# Patient Record
Sex: FEMALE | Race: BLACK OR AFRICAN AMERICAN | NOT HISPANIC OR LATINO | Employment: UNEMPLOYED | URBAN - METROPOLITAN AREA
[De-identification: names, ages, dates, MRNs, and addresses within clinical notes are randomized per-mention and may not be internally consistent; named-entity substitution may affect disease eponyms.]

---

## 2022-05-26 ENCOUNTER — HOSPITAL ENCOUNTER (EMERGENCY)
Facility: HOSPITAL | Age: 27
Discharge: HOME | End: 2022-05-26
Attending: EMERGENCY MEDICINE
Payer: MEDICAID

## 2022-05-26 VITALS
HEART RATE: 115 BPM | DIASTOLIC BLOOD PRESSURE: 99 MMHG | TEMPERATURE: 98 F | SYSTOLIC BLOOD PRESSURE: 155 MMHG | RESPIRATION RATE: 18 BRPM

## 2022-05-26 DIAGNOSIS — T19.2XXA RETAINED TAMPON, INITIAL ENCOUNTER: Primary | ICD-10-CM
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
## 2022-05-26 DIAGNOSIS — N76.0 ACUTE VAGINITIS: ICD-10-CM

## 2022-05-26 LAB
BACTERIA GENITAL QL WET PREP: ABNORMAL
BILIRUB UR QL STRIP: NEGATIVE
CLARITY UR REFRACT.AUTO: ABNORMAL
CLUE CELLS VAG QL WET PREP: ABNORMAL
COLOR UR AUTO: YELLOW
FILAMENT FUNGI VAG WET PREP-#/AREA: ABNORMAL
GLUCOSE UR QL STRIP: NEGATIVE
HGB UR QL STRIP: NEGATIVE
KETONES UR QL STRIP: NEGATIVE
LEUKOCYTE ESTERASE UR QL STRIP: NEGATIVE
NITRITE UR QL STRIP: NEGATIVE
PH UR STRIP: 6 [PH] (ref 5–8)
PROT UR QL STRIP: NEGATIVE
SP GR UR STRIP: 1.02 (ref 1–1.03)
SPECIMEN SOURCE: ABNORMAL
T VAGINALIS GENITAL QL WET PREP: ABNORMAL
URN SPEC COLLECT METH UR: ABNORMAL
WBC #/AREA VAG WET PREP: ABNORMAL
YEAST GENITAL QL WET PREP: ABNORMAL

## 2022-05-26 PROCEDURE — 99282 EMERGENCY DEPT VISIT SF MDM: CPT

## 2022-05-26 PROCEDURE — 87591 N.GONORRHOEAE DNA AMP PROB: CPT | Performed by: EMERGENCY MEDICINE

## 2022-05-26 PROCEDURE — 99284 PR EMERGENCY DEPT VISIT,LEVEL IV: ICD-10-PCS | Mod: ,,, | Performed by: EMERGENCY MEDICINE

## 2022-05-26 PROCEDURE — 99284 EMERGENCY DEPT VISIT MOD MDM: CPT | Mod: ,,, | Performed by: EMERGENCY MEDICINE

## 2022-05-26 PROCEDURE — 87210 SMEAR WET MOUNT SALINE/INK: CPT | Performed by: EMERGENCY MEDICINE

## 2022-05-26 PROCEDURE — 87491 CHLMYD TRACH DNA AMP PROBE: CPT | Performed by: EMERGENCY MEDICINE

## 2022-05-26 PROCEDURE — 81003 URINALYSIS AUTO W/O SCOPE: CPT | Performed by: EMERGENCY MEDICINE

## 2022-05-27 NOTE — ED PROVIDER NOTES
Encounter Date: 2022    SCRIBE #1 NOTE: IMartell, am scribing for, and in the presence of,  Jazmine Garcia MD. I have scribed the following portions of the note - Other sections scribed: HPI.   SCRIBE #2 NOTE: ILilian, am scribing for, and in the presence of,  Jazmine Garcia MD. I have scribed the remaining portions of the note not scribed by Scribe #1. Other sections scribed: ROS PE.     History     Chief Complaint   Patient presents with    Exposure to STD     C/o vaginal burning, odor, and discharge. Denies dysuria     Time patient was seen by the provider: 10:50 PM      The patient is a 27 y.o. female with no significant past medical history, who presents to the ED with a complaint of vaginal discharge onset today. Patient was recently treated a few weeks ago for bacterial vaginosis and a yeast infection with oral medication that improved her symptoms. Tonight the patient had unprotected sexual intercourse with her boyfriend and after she began having white vaginal discharge 2 hours ago. Patient complains of associated abdominal pain, some vaginal itching and a foul smell from her vagina. Patient denies fever, chills, dysuria.     The history is provided by the patient and medical records. No  was used.     Review of patient's allergies indicates:   Allergen Reactions    Amoxicillin Hives     Past Medical History:   Diagnosis Date     labor without delivery, unspecified trimester      Past Surgical History:   Procedure Laterality Date    INTRAUTERINE DEVICE INSERTION  2021    Mirena     Family History   Problem Relation Age of Onset    Hypertension Mother     Breast cancer Mother     Diabetes type II Mother     Hypertension Father     Diabetes type II Father      Social History     Tobacco Use    Smoking status: Former Smoker    Smokeless tobacco: Never Used    Tobacco comment: Sporadic vaping   Substance Use Topics    Drug use: Not Currently      Review of Systems   Constitutional: Negative for chills and fever.   HENT: Negative for sore throat.    Respiratory: Negative for shortness of breath.    Cardiovascular: Negative for chest pain.   Gastrointestinal: Positive for abdominal pain. Negative for nausea.   Genitourinary: Positive for vaginal discharge. Negative for dysuria.        +foul vaginal smell  +vaginal itching    Musculoskeletal: Negative for back pain.   Skin: Negative for rash.   Neurological: Negative for weakness.   Hematological: Does not bruise/bleed easily.       Physical Exam     Initial Vitals [05/26/22 2154]   BP Pulse Resp Temp SpO2   (!) 155/99 (!) 115 18 98.2 °F (36.8 °C) --      MAP       --         Physical Exam    Nursing note and vitals reviewed.  Constitutional: She appears well-developed and well-nourished. She is not diaphoretic. No distress.   HENT:   Head: Normocephalic and atraumatic.   Neck: Neck supple. No JVD present.   Normal range of motion.  Cardiovascular: Regular rhythm, normal heart sounds and intact distal pulses. Tachycardia present.    Pulmonary/Chest: Breath sounds normal. No respiratory distress. She has no wheezes. She has no rhonchi. She has no rales.   Abdominal: Abdomen is soft. There is no abdominal tenderness.   Genitourinary: Cervix exhibits no motion tenderness.    Genitourinary Comments: There is copious vaginal discharge. Retained tampon, once removed there was no CMT tenderness     Musculoskeletal:         General: No tenderness or edema. Normal range of motion.      Cervical back: Normal range of motion and neck supple.     Neurological: She is alert and oriented to person, place, and time.   Skin: Skin is warm and dry.   Psychiatric:   Appears upset         ED Course   Procedures  Labs Reviewed   VAGINAL SCREEN - Abnormal; Notable for the following components:       Result Value    Clue Cells Occasional (*)     WBC - Vaginal Screen Rare (*)     Bacteria - Vaginal Screen Many (*)     All other  components within normal limits    Narrative:     Release to patient->Immediate   URINALYSIS, REFLEX TO URINE CULTURE - Abnormal; Notable for the following components:    Appearance, UA Hazy (*)     All other components within normal limits    Narrative:     Specimen Source->Urine   C. TRACHOMATIS/N. GONORRHOEAE BY AMP DNA          Imaging Results    None          Medications - No data to display  Medical Decision Making:   History:   Old Medical Records: I decided to obtain old medical records.  Initial Assessment:   Urgent evaluation 27-year-old female presenting today with vaginal discharge and odor.    Hypertensive and tachycardic but appears anxious and upset.  Differential Diagnosis:   BV, vaginitis, chlamydia, gonorrhea, retained foreign body  Clinical Tests:   Lab Tests: Ordered and Reviewed  ED Management:  - chlamydia/gonorrhea testing  - vaginal screen  - has metronidazole prescription at home from her appointment 2 weeks ago, I recommended she take the full course.            Scribe Attestation:   Scribe #1: I performed the above scribed service and the documentation accurately describes the services I performed. I attest to the accuracy of the note.  Scribe #2: I performed the above scribed service and the documentation accurately describes the services I performed. I attest to the accuracy of the note.    Attending Attestation:           Physician Attestation for Scribe:  Physician Attestation Statement for Scribe #1: I, LYNDA Garcia MD, reviewed documentation, as scribed by in my presence, and it is both accurate and complete.                      Clinical Impression:   Final diagnoses:  [T19.2XXA] Retained tampon, initial encounter (Primary)  [N76.0] Acute vaginitis          ED Disposition Condition    Discharge Stable        ED Prescriptions     None        Follow-up Information     Follow up With Specialties Details Why Contact Info Additional Information    [facility] Obstetrics and  Gynecology Schedule an appointment as soon as possible for a visit   [address] [facility] [id number]  [phone] OB GYN - [facility], 4th Floor, [address] Please park in [address] and use [place] elevators           Jazmine Garcia MD  05/27/22 0048

## 2022-05-27 NOTE — DISCHARGE INSTRUCTIONS
We will contact you if your chlamydia or gonorrhea test is abnormal.  Meanwhile, start metronidazole

## 2022-05-27 NOTE — ED TRIAGE NOTES
Alicia Schwartz, an 27 y.o. female presents to the ED from home.  C/o itching, burning, foul smell, and discharge after having sexual intercourse two hours ago.  Recent bacterial vaginosis and yeast infection dx last week.      Chief Complaint   Patient presents with    Exposure to STD     C/o vaginal burning, odor, and discharge. Denies dysuria     Review of patient's allergies indicates:   Allergen Reactions    Amoxicillin Hives     Past Medical History:   Diagnosis Date     labor without delivery, unspecified trimester

## 2022-05-28 LAB
C TRACH DNA SPEC QL NAA+PROBE: NOT DETECTED
N GONORRHOEA DNA SPEC QL NAA+PROBE: NOT DETECTED